# Patient Record
(demographics unavailable — no encounter records)

---

## 2025-03-25 NOTE — ASSESSMENT
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 Buckley on 6/27/24 Recovered Well  I agree with Dr. Buckley and recommend catheter cerebral angiogram for routine follow up.    The risks, benefits and alternatives of catheter angiogram were discussed with the patient in detail. In my personal experience, the risks are very rare, but the possibility is not zero. Risks include stroke, brain hemorrhage, any type of disability (facial or extremity weakness, facial or extremity numbness, speech difficulties, blindness) and others. There are also possible complications related to the incisions such as infection, pain, swelling and bleeding.   Plan: Schedule Catheter Cerebral Angiogram

## 2025-03-25 NOTE — HISTORY OF PRESENT ILLNESS
[FreeTextEntry1] : Ms. Mendez is a pleasant 42yo female who presents today for follow up.  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  She underwent craniotomy and clipping of the aneurysm with Dr. Buckley on 6/27/24.  She is here today to discuss follow up angiogram.  She reports feeling well without complaints.  Ne residual deficits from SAH.

## 2025-07-22 NOTE — HISTORY OF PRESENT ILLNESS
[FreeTextEntry1] : Ms. Mendez is a pleasant 42yo female who presents today for follow up.  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  She underwent craniotomy and clipping of the aneurysm with Dr. Buckley on 6/27/24.    On 7/2/25, I performed a catheter angiogram which revealed a small remnant of left anterior choroidal aneurysm that was previously clipped.  She is here today for further discussion.   She reports feeling well without complaints.  No residual deficits from SAH.

## 2025-07-22 NOTE — ASSESSMENT
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 Buckley on 6/27/24 Recovered Well  Catheter Cerebral Angiogram 7/2/25 revealed a small remnant of left anterior choroidal aneurysm that was previously clipped  I discussed with the patient the natural history of brain aneurysms, the various treatment options (endovascular, open surgical) and the pros and cons of treatment versus observation.  I saw Ms. Mendez with Dr. Buckley and we agree with proceed with endovascular embolization of the aneurysm.   The risks, benefits and alternatives of endovascular treatment were discussed with the patient in detail. In my personal experience, the risks are very rare, but the possibility is not zero. Risks include stroke, brain hemorrhage, any type of disability (facial or extremity weakness, facial or extremity numbness, speech difficulties, blindness) and death. There are also possible complications related to the incisions such as infection, pain, swelling and bleeding.  Treatment may require stent-assisted embolization and therefore the patient needs premedication with dual antiplatelet therapy and continuing dual-antiplatelet therapy for 3-6 months after treatment. Will need antiplatelet monotherapy for life.  Plan: Schedule Pipeline Embolization of Cerebral Aneurysm (10/2025) Will Need ASA and Brilinta Prior to the Procedure

## 2025-07-24 NOTE — PHYSICAL EXAM
[General Appearance - Alert] : alert [Clean] : clean [Dry] : dry [Healing Well] : healing well [Sutures Intact] : closed with intact sutures [Staple Intact] : closed with intact staples [No Drainage] : without drainage [Normal Skin] : normal [Erythema] : not erythematous [Tender] : not tender [Warm] : not warm [FreeTextEntry1] : Left pterional craniotomy [Oriented To Time, Place, And Person] : oriented to person, place, and time [Cranial Nerves Optic (II)] : visual acuity intact bilaterally,  pupils equal round and reactive to light [Cranial Nerves Oculomotor (III)] : extraocular motion intact [Cranial Nerves Trigeminal (V)] : facial sensation intact symmetrically [Cranial Nerves Facial (VII)] : face symmetrical [Cranial Nerves Vestibulocochlear (VIII)] : hearing was intact bilaterally [Cranial Nerves Accessory (XI - Cranial And Spinal)] : head turning and shoulder shrug symmetric [Cranial Nerves Hypoglossal (XII)] : there was no tongue deviation with protrusion [Motor Tone] : muscle tone was normal in all four extremities [Sensation Tactile Decrease] : light touch was intact [Sclera] : the sclera and conjunctiva were normal [PERRL With Normal Accommodation] : pupils were equal in size, round, reactive to light, with normal accommodation [Extraocular Movements] : extraocular movements were intact [Outer Ear] : the ears and nose were normal in appearance [Hearing Threshold Finger Rub Not [place]] : hearing was normal [Neck Appearance] : the appearance of the neck was normal [] : no respiratory distress [Exaggerated Use Of Accessory Muscles For Inspiration] : no accessory muscle use [Abnormal Walk] : normal gait [Skin Color & Pigmentation] : normal skin color and pigmentation [General Appearance - In No Acute Distress] : in no acute distress [FreeTextEntry7] : EVD drain removal site [Person] : oriented to person [Place] : oriented to place [Time] : oriented to time

## 2025-07-24 NOTE — ASSESSMENT
[FreeTextEntry1] : Impression: 41 y/o F, with PMH of migraines, no previous AC/AP, p/w WHOL, found to have SAH worthy and Vasquez 2 Mosher 3. s/p dx angio 6/26/24 revealing small ruptured 1.5 mm anterior chroidal artery aneurysm s/p L pterional crani and clipping of ruptured L anterior choroidal artery aneurysm 6/27/24 by Dr. Buckley.   7/2/2025 cerebral angiogram- Impression: Residual left anterior choroidal artery aneurysm measuring approximately 1.2 x 1.0 mm essentially unchanged from prior study on 6/26/2024  Plan: Discussed with patient results of the cerebral angiogram in conjunction with Dr. Cisneros and together recommend the plan consisting of follow up cerebral angiogram with Dr. Cisneros in 3 months then after at that time will discuss need for stenting or not

## 2025-07-24 NOTE — PHYSICAL EXAM
[General Appearance - Alert] : alert [Clean] : clean [Dry] : dry [Healing Well] : healing well [Sutures Intact] : closed with intact sutures [Staple Intact] : closed with intact staples [No Drainage] : without drainage [Normal Skin] : normal [Erythema] : not erythematous [Tender] : not tender [Warm] : not warm [FreeTextEntry1] : Left pterional craniotomy [Oriented To Time, Place, And Person] : oriented to person, place, and time [Cranial Nerves Optic (II)] : visual acuity intact bilaterally,  pupils equal round and reactive to light [Cranial Nerves Oculomotor (III)] : extraocular motion intact [Cranial Nerves Trigeminal (V)] : facial sensation intact symmetrically [Cranial Nerves Facial (VII)] : face symmetrical [Cranial Nerves Vestibulocochlear (VIII)] : hearing was intact bilaterally [Cranial Nerves Accessory (XI - Cranial And Spinal)] : head turning and shoulder shrug symmetric [Cranial Nerves Hypoglossal (XII)] : there was no tongue deviation with protrusion [Motor Tone] : muscle tone was normal in all four extremities [Sensation Tactile Decrease] : light touch was intact [Sclera] : the sclera and conjunctiva were normal [PERRL With Normal Accommodation] : pupils were equal in size, round, reactive to light, with normal accommodation [Extraocular Movements] : extraocular movements were intact [Outer Ear] : the ears and nose were normal in appearance [Hearing Threshold Finger Rub Not [place]] : hearing was normal [Neck Appearance] : the appearance of the neck was normal [] : no respiratory distress [Exaggerated Use Of Accessory Muscles For Inspiration] : no accessory muscle use [Abnormal Walk] : normal gait [Skin Color & Pigmentation] : normal skin color and pigmentation [General Appearance - In No Acute Distress] : in no acute distress [FreeTextEntry7] : EVD drain removal site [Person] : oriented to person [Place] : oriented to place [Time] : oriented to time

## 2025-07-24 NOTE — REASON FOR VISIT
[de-identified] : Left pterional craniotomy and clipping of a complex true anterior choroidal artery aneurysm [de-identified] : 6/27/24 [de-identified] : PREOPERATIVE DIAGNOSIS:  Left ruptured anterior choroidal artery aneurysm. POSTOPERATIVE DIAGNOSIS:  Left ruptured anterior choroidal artery aneurysm.

## 2025-07-24 NOTE — REASON FOR VISIT
[de-identified] : Left pterional craniotomy and clipping of a complex true anterior choroidal artery aneurysm [de-identified] : 6/27/24 [de-identified] : PREOPERATIVE DIAGNOSIS:  Left ruptured anterior choroidal artery aneurysm. POSTOPERATIVE DIAGNOSIS:  Left ruptured anterior choroidal artery aneurysm.

## 2025-07-24 NOTE — HISTORY OF PRESENT ILLNESS
[FreeTextEntry1] : DANNIE DICKERSON is a 40-year-old female with PMH of migraines, no previous AC/AP, who presented with subarachnoid hemorrhage SAH hunt and Vasquez 2 Mosher 3. On 6/26/24, a CTA and cerebral angiography was performed with Dr. Cisneros identified a 1.5 mm aneurysm emanating from the anterior choroidal artery. The aneurysm is from the anterior choroidal artery itself with a complex configuration making endovascular treatment very difficult. We decided for surgical clipping of this aneurysm.  Risks of surgery including stroke, hematoma, infection, need for further procedures in future including treatment of vasospasm were explained with the rationale for treatment. Informed consent was obtained. To note that the patient had to receive a ventriculostomy on the right frontal side the day before the surgery because of increased drowsiness.  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 External ventricular drain clamped on 7/8/24, discontinued on 7/10/24. Discharged home on 7/12/24.  Today, patient presents for one year follow up visit after follow up cerebral angiogram done by Dr. Cisneros. She feels well denies any motor sensory speech visual abnormalities.